# Patient Record
Sex: MALE | Race: WHITE | NOT HISPANIC OR LATINO | URBAN - METROPOLITAN AREA
[De-identification: names, ages, dates, MRNs, and addresses within clinical notes are randomized per-mention and may not be internally consistent; named-entity substitution may affect disease eponyms.]

---

## 2017-03-29 DIAGNOSIS — L64.9 ANDROGENIC ALOPECIA: ICD-10-CM

## 2017-03-29 DIAGNOSIS — L65.9 LOSS OF HAIR: ICD-10-CM

## 2017-03-29 RX ORDER — FINASTERIDE 1 MG/1
TABLET, FILM COATED ORAL
Qty: 90 TABLET | Refills: 1 | Status: SHIPPED | OUTPATIENT
Start: 2017-03-29 | End: 2017-10-19

## 2017-03-29 NOTE — TELEPHONE ENCOUNTER
Received a refill request for finasteride as the resident on call. Patient was last seen 3/21/16 by Dr. Alvarenga for androgenetic alopecia with directions for him to f/u in 1.5 years. After reviewing the patient's chart and the assessment and plan, refill request x 6 months was accepted.     Aliza Miranda MD  PGY-3 Dermatology  Pager: [phone]

## 2017-09-30 ENCOUNTER — HOSPITAL ENCOUNTER (EMERGENCY)
Facility: HOSPITAL | Age: 32
Discharge: HOME | End: 2017-09-30
Attending: EMERGENCY MEDICINE

## 2017-09-30 VITALS
RESPIRATION RATE: 20 BRPM | DIASTOLIC BLOOD PRESSURE: 85 MMHG | HEIGHT: 72 IN | TEMPERATURE: 98 F | SYSTOLIC BLOOD PRESSURE: 168 MMHG | WEIGHT: 200 LBS | OXYGEN SATURATION: 100 % | HEART RATE: 122 BPM | BODY MASS INDEX: 27.09 KG/M2

## 2017-09-30 DIAGNOSIS — R56.9 NEW ONSET SEIZURE: ICD-10-CM

## 2017-09-30 DIAGNOSIS — T67.5XXA HEAT EXHAUSTION, INITIAL ENCOUNTER: ICD-10-CM

## 2017-09-30 DIAGNOSIS — R56.9 SEIZURE: ICD-10-CM

## 2017-09-30 DIAGNOSIS — E86.0 DEHYDRATION: Primary | ICD-10-CM

## 2017-09-30 LAB
ALBUMIN SERPL BCP-MCNC: 4.5 G/DL
ALP SERPL-CCNC: 60 U/L
ALT SERPL W/O P-5'-P-CCNC: 116 U/L
AMPHET+METHAMPHET UR QL: NEGATIVE
ANION GAP SERPL CALC-SCNC: 19 MMOL/L
AST SERPL-CCNC: 109 U/L
BARBITURATES UR QL SCN>200 NG/ML: NEGATIVE
BASOPHILS # BLD AUTO: 0 K/UL
BASOPHILS NFR BLD: 0.2 %
BENZODIAZ UR QL SCN>200 NG/ML: NEGATIVE
BILIRUB SERPL-MCNC: 1.2 MG/DL
BUN SERPL-MCNC: 12 MG/DL
BZE UR QL SCN: NEGATIVE
CALCIUM SERPL-MCNC: 10.2 MG/DL
CANNABINOIDS UR QL SCN: NEGATIVE
CHLORIDE SERPL-SCNC: 95 MMOL/L
CO2 SERPL-SCNC: 19 MMOL/L
CREAT SERPL-MCNC: 1.1 MG/DL
CREAT UR-MCNC: 117.2 MG/DL
DIFFERENTIAL METHOD: ABNORMAL
EOSINOPHIL # BLD AUTO: 0 K/UL
EOSINOPHIL NFR BLD: 0.3 %
ERYTHROCYTE [DISTWIDTH] IN BLOOD BY AUTOMATED COUNT: 13 %
EST. GFR  (AFRICAN AMERICAN): >60 ML/MIN/1.73 M^2
EST. GFR  (NON AFRICAN AMERICAN): >60 ML/MIN/1.73 M^2
ETHANOL SERPL-MCNC: <10 MG/DL
GLUCOSE SERPL-MCNC: 142 MG/DL
HCT VFR BLD AUTO: 43.4 %
HGB BLD-MCNC: 15 G/DL
LYMPHOCYTES # BLD AUTO: 0.5 K/UL
LYMPHOCYTES NFR BLD: 7.3 %
MAGNESIUM SERPL-MCNC: 2.1 MG/DL
MCH RBC QN AUTO: 32.7 PG
MCHC RBC AUTO-ENTMCNC: 34.5 G/DL
MCV RBC AUTO: 95 FL
METHADONE UR QL SCN>300 NG/ML: NEGATIVE
MONOCYTES # BLD AUTO: 0.6 K/UL
MONOCYTES NFR BLD: 8.4 %
NEUTROPHILS # BLD AUTO: 5.8 K/UL
NEUTROPHILS NFR BLD: 83.8 %
OPIATES UR QL SCN: NEGATIVE
PCP UR QL SCN>25 NG/ML: NEGATIVE
PHOSPHATE SERPL-MCNC: 1.9 MG/DL
PLATELET # BLD AUTO: 221 K/UL
PMV BLD AUTO: 7.7 FL
POTASSIUM SERPL-SCNC: 4 MMOL/L
PROT SERPL-MCNC: 7.8 G/DL
RBC # BLD AUTO: 4.58 M/UL
SODIUM SERPL-SCNC: 133 MMOL/L
TOXICOLOGY INFORMATION: NORMAL
WBC # BLD AUTO: 6.9 K/UL

## 2017-09-30 PROCEDURE — 80320 DRUG SCREEN QUANTALCOHOLS: CPT

## 2017-09-30 PROCEDURE — 99285 EMERGENCY DEPT VISIT HI MDM: CPT | Mod: 25

## 2017-09-30 PROCEDURE — 93005 ELECTROCARDIOGRAM TRACING: CPT

## 2017-09-30 PROCEDURE — 83735 ASSAY OF MAGNESIUM: CPT

## 2017-09-30 PROCEDURE — 25000003 PHARM REV CODE 250: Performed by: EMERGENCY MEDICINE

## 2017-09-30 PROCEDURE — 85025 COMPLETE CBC W/AUTO DIFF WBC: CPT

## 2017-09-30 PROCEDURE — 96361 HYDRATE IV INFUSION ADD-ON: CPT

## 2017-09-30 PROCEDURE — 36415 COLL VENOUS BLD VENIPUNCTURE: CPT

## 2017-09-30 PROCEDURE — 80053 COMPREHEN METABOLIC PANEL: CPT

## 2017-09-30 PROCEDURE — 84100 ASSAY OF PHOSPHORUS: CPT

## 2017-09-30 PROCEDURE — 96360 HYDRATION IV INFUSION INIT: CPT

## 2017-09-30 PROCEDURE — 80307 DRUG TEST PRSMV CHEM ANLYZR: CPT

## 2017-09-30 RX ORDER — SODIUM CHLORIDE 9 MG/ML
1000 INJECTION, SOLUTION INTRAVENOUS
Status: COMPLETED | OUTPATIENT
Start: 2017-09-30 | End: 2017-09-30

## 2017-09-30 RX ORDER — NAPROXEN 250 MG/1
500 TABLET ORAL
Status: DISCONTINUED | OUTPATIENT
Start: 2017-09-30 | End: 2017-09-30

## 2017-09-30 RX ADMIN — SODIUM CHLORIDE 1000 ML: 0.9 INJECTION, SOLUTION INTRAVENOUS at 01:09

## 2017-09-30 NOTE — ED PROVIDER NOTES
"Encounter Date: 9/30/2017    SCRIBE #1 NOTE: I, Bhakti Orlando , am scribing for, and in the presence of,  Dr. Dumas . I have scribed the entire note.       History     Chief Complaint   Patient presents with    Seizures     seizure today without hx.         09/30/2017  1:14 PM     Chief Complaint: Sz      The patient is a 31 y.o. male who is presenting with the acute onset of a sz that occurred earlier today while on a swamp tour. Per pt's wife, the pt became "very hot, diaphoretic, and began to sz while in the bathroom". She reports that the pt fell backwards, landed on the floor, and started to have "whole body shaking". She denies knowing how long the episode lasted but confirms positive post ictal state. No bladder incontinence. The pt endorses positive LOC with current bilateral LE myalgias, feelings of confusion, and generalized fatigue. The pt confirms having approximately 2 alcohol drinks the previous night but denies drug use or preceding sx including numbness, CP, HA, or changes in vision. No other comments or complaints. No pertinent past medical or surgical hx.             The history is provided by the patient, medical records and the spouse.     Review of patient's allergies indicates:  No Known Allergies  History reviewed. No pertinent past medical history.  History reviewed. No pertinent surgical history.  History reviewed. No pertinent family history.  Social History   Substance Use Topics    Smoking status: Former Smoker    Smokeless tobacco: Not on file    Alcohol use Not on file     Review of Systems   Constitutional: Positive for diaphoresis and fatigue. Negative for fever.   HENT: Negative for sore throat.    Eyes: Negative for visual disturbance.   Respiratory: Negative for shortness of breath.    Cardiovascular: Negative for chest pain.   Gastrointestinal: Negative for nausea.   Genitourinary: Negative for dysuria.   Musculoskeletal: Negative for back pain.   Skin: Negative for rash. "   Neurological: Positive for seizures and syncope. Negative for weakness.        +LOC   Hematological: Does not bruise/bleed easily.   Psychiatric/Behavioral: Negative for confusion.       Physical Exam     Initial Vitals [09/30/17 1300]   BP Pulse Resp Temp SpO2   (!) 168/85 (!) 122 20 97.9 °F (36.6 °C) 100 %      MAP       112.67         Physical Exam    Nursing note and vitals reviewed.  Constitutional: He appears well-developed and well-nourished.   HENT:   Head: Normocephalic and atraumatic.   Mouth/Throat: Oropharynx is clear and moist.   Eyes: EOM are normal. Pupils are equal, round, and reactive to light. No scleral icterus.   Neck: Normal range of motion. Neck supple.   Cardiovascular: Regular rhythm, normal heart sounds and intact distal pulses. Exam reveals no gallop and no friction rub.    No murmur heard.  Tachycardic rate    Pulmonary/Chest: Breath sounds normal. He has no wheezes. He has no rhonchi. He has no rales. He exhibits no tenderness.   Abdominal: Soft. He exhibits no distension. There is no tenderness.   Musculoskeletal: Normal range of motion. He exhibits no edema or tenderness.   Lymphadenopathy:     He has no cervical adenopathy.   Neurological: He is alert and oriented to person, place, and time. He has normal strength. No sensory deficit.   CN II through XII intact    Skin: Skin is warm and dry. Capillary refill takes less than 2 seconds.   Psychiatric: He has a normal mood and affect.         ED Course   Procedures  Labs Reviewed   CBC W/ AUTO DIFFERENTIAL - Abnormal; Notable for the following:        Result Value    RBC 4.58 (*)     MCH 32.7 (*)     MPV 7.7 (*)     Lymph # 0.5 (*)     Gran% 83.8 (*)     Lymph% 7.3 (*)     All other components within normal limits   COMPREHENSIVE METABOLIC PANEL - Abnormal; Notable for the following:     Sodium 133 (*)     CO2 19 (*)     Glucose 142 (*)     Total Bilirubin 1.2 (*)      (*)      (*)     Anion Gap 19 (*)     All other  components within normal limits   PHOSPHORUS - Abnormal; Notable for the following:     Phosphorus 1.9 (*)     All other components within normal limits   MAGNESIUM   DRUG SCREEN PANEL, URINE EMERGENCY   ALCOHOL,MEDICAL (ETHANOL)     EKG Readings: (Independently Interpreted)   Rhythm: Sinus Tachycardia. Heart Rate: 106. Ectopy: No Ectopy. Conduction: Normal. Axis: Normal. Clinical Impression: Sinus Tachycardia          Medical Decision Making:   Clinical Tests:   Lab Tests: Ordered and Reviewed  The following lab test(s) were unremarkable: CBC and CMP  Radiological Study: Ordered and Reviewed  ED Management:  Reji Nicole is a 31 y.o. male who presents with  a witnessed seizure with a reported jaw dislocation which was reduced by a dentist.  He reports profound diaphoresis in excessive heat suggesting he exhaustion.  CT of the head is normal with no focal neurologic deficits.  This may represent he exhaustion versus new onset seizure.  In the absence of any focal neurologic deficit no further workup is mandatory with antiepileptics deferred.  He is encouraged to follow-up with a neurologist for consideration of any EEG.            Scribe Attestation:   Scribe #1: I performed the above scribed service and the documentation accurately describes the services I performed. I attest to the accuracy of the note.    Attending Attestation:           Physician Attestation for Scribe:  Physician Attestation Statement for Scribe #1: I, Dr. Dumas, reviewed documentation, as scribed by Bhakti Orlando  in my presence, and it is both accurate and complete.                 ED Course      Clinical Impression:   Diagnoses of Seizure and Seizure were pertinent to this visit.                           Piero Dumas III, MD  09/30/17 4504

## 2017-09-30 NOTE — ED NOTES
Patient ambulating in ED; states the calf stiffness is loosening up;  Verbalizes readiness for discharge and declined further treatment.

## 2017-10-11 DIAGNOSIS — L64.9 ANDROGENIC ALOPECIA: ICD-10-CM

## 2017-10-11 DIAGNOSIS — L65.9 LOSS OF HAIR: ICD-10-CM

## 2017-10-11 RX ORDER — FINASTERIDE 1 MG/1
1 TABLET, FILM COATED ORAL DAILY
Qty: 90 TABLET | Refills: 1 | OUTPATIENT
Start: 2017-10-11

## 2017-10-11 NOTE — TELEPHONE ENCOUNTER
Last seen 3/21/16: Chito is on the recall list for a follow up appointment   1. Androgenetic alopecia    Continue with finasteride 1 mg daily      Follow-up in 1 1/2 years, earlier for new or changing lesions or if other concerns

## 2017-10-11 NOTE — TELEPHONE ENCOUNTER
Pt has not been seen since March 2016 and is requesting a med refill. Medications will not be refilled until pt RTC

## 2017-10-19 DIAGNOSIS — L64.9 ANDROGENIC ALOPECIA: ICD-10-CM

## 2017-10-19 DIAGNOSIS — L65.9 LOSS OF HAIR: ICD-10-CM

## 2017-10-19 RX ORDER — FINASTERIDE 1 MG/1
TABLET, FILM COATED ORAL
Qty: 30 TABLET | Refills: 0 | Status: SHIPPED | OUTPATIENT
Start: 2017-10-19 | End: 2017-11-06

## 2017-10-19 NOTE — TELEPHONE ENCOUNTER
Pt will be scheduled for an appointment as soon as  opens up a clinic date.   Are you able to refill a month worth of  Finasteride. He has been out of medication for the past two days.       Ronda Rojas RN

## 2017-11-06 ENCOUNTER — OFFICE VISIT (OUTPATIENT)
Dept: DERMATOLOGY | Facility: CLINIC | Age: 32
End: 2017-11-06

## 2017-11-06 DIAGNOSIS — L65.9 LOSS OF HAIR: ICD-10-CM

## 2017-11-06 DIAGNOSIS — L64.9 ANDROGENIC ALOPECIA: ICD-10-CM

## 2017-11-06 RX ORDER — FINASTERIDE 1 MG/1
1 TABLET, FILM COATED ORAL DAILY
Qty: 90 TABLET | Refills: 7 | Status: SHIPPED | OUTPATIENT
Start: 2017-11-06 | End: 2018-11-28

## 2017-11-06 ASSESSMENT — PAIN SCALES - GENERAL: PAINLEVEL: NO PAIN (0)

## 2017-11-06 NOTE — NURSING NOTE
"Dermatology Rooming Note    Chito Driver's goals for this visit include:   Chief Complaint   Patient presents with     Hair Loss     Chito is here today for a hair loss follow up. chito notes\" Everything is great\"      Merry Mao, RENA        "

## 2017-11-06 NOTE — LETTER
"11/6/2017       RE: Chito Driver  [address] [facility] [address]     Dear Colleague,    Thank you for referring your patient, Chito Driver, to the [facility] DERMATOLOGY at [facility]. Please see a copy of my visit note below.    [facility] Dermatology Note      Dermatology Problem List:  1. Androgenetic Alopecia, well-controlled and improved   -finasteride 1mg daily    Encounter Date: Nov 6, 2017    CC:  Chief Complaint   Patient presents with     Hair Loss     Chito is here today for a hair loss follow up. chito notes\" Everything is great\"          History of Present Illness:  Mr. Chito Driver is a 32 year old male who presents as a follow-up for androgenetic alopecia. The patient was last seen 3/21/2016 with Dr. Alvarenga when he was continued on his finasteride 1 mg.  Patient is happy that his hairline has returned to its previous location before the loss and he believes that the density of hair is close to or the same as before hair loss began.  He has no complaints of sexual side effects or other medication SEs.  He has had no interval health changes.  No burning, itching, and pain of the scalp. Uses nioxin 1-2x/wk and J crew the rest of the time for shampoo and denies any flaking or buildup.     Past Medical History:   Patient Active Problem List   Diagnosis     Social phobia     Loss of hair     Insomnia     Transplant donor evaluation     Androgenic alopecia     Hyperlipidemia LDL goal <160     Calculus of kidney     Past Medical History:   Diagnosis Date     Kidney stone      Past Surgical History:   Procedure Laterality Date     ORTHOPEDIC SURGERY         Social History:  The patient works as a .     Family History:  There is a family history of hair loss in the patient's father and both grandfathers.     Medications:  Current Outpatient Prescriptions   Medication Sig Dispense Refill     finasteride (PROPECIA) " 1 MG tablet TAKE ONE TABLET BY MOUTH DAILY 30 tablet 0     No Known Allergies      Review of Systems:  -As per HPI  -Constitutional: The patient denies fatigue, fevers, chills, unintended weight loss, and night sweats.  -HEENT: Patient denies nonhealing oral sores.  -Skin: As above in HPI. No additional skin concerns.    Physical exam:  Vitals: There were no vitals taken for this visit.  GEN: This is a well developed, well-nourished male in no acute distress, in a pleasant mood.    SKIN: Focused examination of the scalp was performed.  -strong robust regrowth of the frontotemporal scalp bilaterally with shorter hairs noted compared to rest of the scalp which had no signs of alopecia   - no significant erythema, folliculitis or scale  -No other lesions of concern on areas examined.     Impression/Plan:  1. Androgenetic alopecia    Continue with finasteride 1 mg daily, 2 years of refills sent    If he develops scale recommended an anti dandruff shampoo    Follow-up in 2 years, earlier for new or changing lesions or if other concerns    Staffed with Dr. Alvarenga    Staff Involved:  Resident (Elsi Miguel MD) / Staff (as above)      Patient was seen and examined with the dermatology resident. I agree with the history, review of systems, physical examination, assessments and plan.    Criss Alvarenga MD  Professor and  Chair  Department of Dermatology  [facility]    Pictures were placed in Pt's chart today for future reference.              Again, thank you for allowing me to participate in the care of your patient.      Sincerely,    Criss Alvarenga MD

## 2017-11-06 NOTE — PROGRESS NOTES
"[facility] Dermatology Note      Dermatology Problem List:  1. Androgenetic Alopecia, well-controlled and improved   -finasteride 1mg daily    Encounter Date: Nov 6, 2017    CC:  Chief Complaint   Patient presents with     Hair Loss     Chito is here today for a hair loss follow up. chito notes\" Everything is great\"          History of Present Illness:  Mr. Chito Driver is a 32 year old male who presents as a follow-up for androgenetic alopecia. The patient was last seen 3/21/2016 with Dr. Alvarenga when he was continued on his finasteride 1 mg.  Patient is happy that his hairline has returned to its previous location before the loss and he believes that the density of hair is close to or the same as before hair loss began.  He has no complaints of sexual side effects or other medication SEs.  He has had no interval health changes.  No burning, itching, and pain of the scalp. Uses nioxin 1-2x/wk and J crew the rest of the time for shampoo and denies any flaking or buildup.     Past Medical History:   Patient Active Problem List   Diagnosis     Social phobia     Loss of hair     Insomnia     Transplant donor evaluation     Androgenic alopecia     Hyperlipidemia LDL goal <160     Calculus of kidney     Past Medical History:   Diagnosis Date     Kidney stone      Past Surgical History:   Procedure Laterality Date     ORTHOPEDIC SURGERY         Social History:  The patient works as a .     Family History:  There is a family history of hair loss in the patient's father and both grandfathers.     Medications:  Current Outpatient Prescriptions   Medication Sig Dispense Refill     finasteride (PROPECIA) 1 MG tablet TAKE ONE TABLET BY MOUTH DAILY 30 tablet 0     No Known Allergies      Review of Systems:  -As per HPI  -Constitutional: The patient denies fatigue, fevers, chills, unintended weight loss, and night sweats.  -HEENT: Patient denies nonhealing oral sores.  -Skin: As above " in HPI. No additional skin concerns.    Physical exam:  Vitals: There were no vitals taken for this visit.  GEN: This is a well developed, well-nourished male in no acute distress, in a pleasant mood.    SKIN: Focused examination of the scalp was performed.  -strong robust regrowth of the frontotemporal scalp bilaterally with shorter hairs noted compared to rest of the scalp which had no signs of alopecia   - no significant erythema, folliculitis or scale  -No other lesions of concern on areas examined.     Impression/Plan:  1. Androgenetic alopecia    Continue with finasteride 1 mg daily, 2 years of refills sent    If he develops scale recommended an anti dandruff shampoo    Follow-up in 2 years, earlier for new or changing lesions or if other concerns    Staffed with Dr. Alvarenga    Staff Involved:  Resident (Elsi Miguel MD) / Staff (as above)      Patient was seen and examined with the dermatology resident. I agree with the history, review of systems, physical examination, assessments and plan.    Criss Alvarenga MD  Professor and  Chair  Department of Dermatology  [facility]

## 2017-11-06 NOTE — MR AVS SNAPSHOT
After Visit Summary   2017    Chito Driver    MRN: [id number]           Patient Information     Date Of Birth          1985        Visit Information        Provider Department      2017 2:45 PM Criss Alvarenga MD [facility] Dermatology        Today's Diagnoses     Loss of hair        Androgenic alopecia           Follow-ups after your visit        Who to contact     Please call your clinic at [phone] to:    Ask questions about your health    Make or cancel appointments    Discuss your medicines    Learn about your test results    Speak to your doctor   If you have compliments or concerns about an experience at your clinic, or if you wish to file a complaint, please contact [facility] Patient Relations at [phone] or email us at [email or web address]         Additional Information About Your Visit        MyChart Information     Doctor on Demand is an electronic gateway that provides easy, online access to your medical records. With Doctor on Demand, you can request a clinic appointment, read your test results, renew a prescription or communicate with your care team.     To sign up for [employer] visit the website at [email or web address]   You will be asked to enter the access code listed below, as well as some personal information. Please follow the directions to create your username and password.     Your access code is: [email or web address]  Expires: 2018  5:30 AM     Your access code will  in 90 days. If you need help or a new code, please contact your [facility] Clinic or call [phone] for assistance.        Care EveryWhere ID     This is your Care EveryWhere ID. This could be used by other organizations to access your [place] medical records  [id number]         Blood Pressure from Last 3 Encounters:   16 136/83   10/16/15 127/84   10/08/15 (!) 136/92    Weight from Last 3 Encounters:   10/16/15  108.2 kg (238 lb 8 oz)   10/08/15 97.5 kg (215 lb)   10/08/15 105.2 kg (232 lb)              Today, you had the following     No orders found for display         Today's Medication Changes          These changes are accurate as of: 11/6/17 11:59 PM.  If you have any questions, ask your nurse or doctor.               These medicines have changed or have updated prescriptions.        Dose/Directions    finasteride 1 MG tablet   Commonly known as:  PROPECIA   This may have changed:  See the new instructions.   Used for:  Loss of hair, Androgenic alopecia   Changed by:  Criss Alvarenga MD        Dose:  1 tablet   Take 1 tablet (1 mg) by mouth daily   Quantity:  90 tablet   Refills:  7            Where to get your medicines      These medications were sent to [facility] [address] [address] AT [facility] OF [address] [address]     Phone:  [phone]     finasteride 1 MG tablet                Primary Care Provider Office Phone # Fax #    [facility] [phone]       [address] [facility] [address]  [facility] [address]        Equal Access to Services     KATY CRESPO AH: Hadii alfonso beltran hadanthonyo Soteri, waaxda luqadaha, qaybta kaalmada adeegyada, baylee holley. So [facility] [phone].    ATENCIÓN: Si habla español, tiene a cunningham disposición servicios gratuitos de asistencia lingüística. [facility] [phone].    We comply with applicable federal civil rights laws and [place] laws. We do not discriminate on the basis of race, color, national origin, age, disability, sex, sexual orientation, or gender identity.            Thank you!     Thank you for choosing [facility] DERMATOLOGY  for your care. Our goal is always to provide you with excellent care. Hearing back from our patients is one way we can continue to improve our services. Please take a few minutes to complete the written survey that you may  receive in the mail after your visit with us. Thank you!             Your Updated Medication List - Protect others around you: Learn how to safely use, store and throw away your medicines at www.disposemymeds.org.          This list is accurate as of: 11/6/17 11:59 PM.  Always use your most recent med list.                   Brand Name Dispense Instructions for use Diagnosis    finasteride 1 MG tablet    PROPECIA    90 tablet    Take 1 tablet (1 mg) by mouth daily    Loss of hair, Androgenic alopecia

## 2017-11-07 ENCOUNTER — TELEPHONE (OUTPATIENT)
Dept: DERMATOLOGY | Facility: CLINIC | Age: 32
End: 2017-11-07

## 2017-11-07 NOTE — TELEPHONE ENCOUNTER
"PRIOR AUTHORIZATION DENIED    Medication: finasteride (PROPECIA) 1 MG tablet - denied     Denial Date: 11/7/2017    Denial Rational: \"Treatment, procedures, or services or drugs which are not medically or dental necessary and/or are primarily educational in nature of for the vocation, comfort.....are not covered\"    Appeal Information:         "

## 2017-11-07 NOTE — TELEPHONE ENCOUNTER
[facility] Prior Authorization Team   Phone: [phone]  Fax: [phone]      PA Initiation    Medication: finasteride (PROPECIA) 1 MG tablet  Insurance Company: [employer] - Phone [phone] Fax [phone]  Pharmacy Filling the Rx: [facility] [id number] [place] [address] [place] AT [facility] OF [facility] [place]  Filling Pharmacy Phone: [phone]  Filling Pharmacy Fax: [phone]  Start Date: 11/7/2017

## 2018-11-23 DIAGNOSIS — L65.9 LOSS OF HAIR: ICD-10-CM

## 2018-11-23 DIAGNOSIS — L64.9 ANDROGENIC ALOPECIA: ICD-10-CM

## 2018-11-27 RX ORDER — FINASTERIDE 1 MG/1
TABLET, FILM COATED ORAL
Qty: 90 TABLET | Refills: 0 | OUTPATIENT
Start: 2018-11-27

## 2018-11-27 NOTE — TELEPHONE ENCOUNTER
Last Clinic Visit:  11/6/17   NV: NONE  RF DENIED  Scheduling has been notified to contact the pt for appointment

## 2018-11-28 DIAGNOSIS — L64.9 ANDROGENIC ALOPECIA: ICD-10-CM

## 2018-11-28 DIAGNOSIS — L65.9 LOSS OF HAIR: ICD-10-CM

## 2018-11-28 RX ORDER — FINASTERIDE 1 MG/1
1 TABLET, FILM COATED ORAL DAILY
Qty: 90 TABLET | Refills: 3 | Status: SHIPPED | OUTPATIENT
Start: 2018-11-28 | End: 2019-12-03

## 2019-12-03 ENCOUNTER — TELEPHONE (OUTPATIENT)
Dept: DERMATOLOGY | Facility: CLINIC | Age: 34
End: 2019-12-03

## 2019-12-03 DIAGNOSIS — L65.9 LOSS OF HAIR: ICD-10-CM

## 2019-12-03 DIAGNOSIS — L64.9 ANDROGENIC ALOPECIA: ICD-10-CM

## 2019-12-03 RX ORDER — FINASTERIDE 1 MG/1
1 TABLET, FILM COATED ORAL DAILY
Qty: 30 TABLET | Refills: 0 | Status: SHIPPED | OUTPATIENT
Start: 2019-12-03 | End: 2021-01-30

## 2019-12-03 NOTE — TELEPHONE ENCOUNTER
finasteride (PROPECIA) 1 MG tablet      Last Written Prescription Date:  11/28/18  Last Fill Quantity: 90 tab,   # refills: 3  Last Office Visit : 11/6/17  Future Office visit:  Recall list    Routing refill request to provider for review/approval because:  1. Medication not on the Derm refill protocol.  2. Appointment due Nov 2019- on recall list. *message sent to clinic coordinator.    Plan 11/6/17 visit:  Androgenetic alopecia    Continue with finasteride 1 mg daily, 2 years of refills sent    If he develops scale recommended an anti dandruff shampoo     Follow-up in 2 years, earlier for new or changing lesions or if other concerns.

## 2019-12-04 NOTE — TELEPHONE ENCOUNTER
Received refill request for finasteride as the resident on call. Reviewed patient's chart and attached communication. Patient last seen 11/2017 for alopecia. RTC 2 years,  not yet scheduled. After reviewing the medication list and assessment and plan from last visit, the refill request was accepted for one month s supply until patient can be scheduled in clinic. No further refills should be granted until patient is scheduled in clinic.    The patient's information will be forwarded to the scheduling pool for return visit as planned at prior visit.      Almaz Levine PGY2  Dermatology Resident  pager

## 2019-12-05 DIAGNOSIS — L64.9 ANDROGENIC ALOPECIA: ICD-10-CM

## 2019-12-05 DIAGNOSIS — L65.9 LOSS OF HAIR: ICD-10-CM

## 2019-12-06 RX ORDER — FINASTERIDE 1 MG/1
TABLET, FILM COATED ORAL
Qty: 90 TABLET | Refills: 0 | OUTPATIENT
Start: 2019-12-06

## 2019-12-06 NOTE — TELEPHONE ENCOUNTER
"finasteride (PROPECIA) 1 MG tablet. 90 day supply requested.    Last clinic visit: 11/6/17    Per resident note 12/3/19:   \"Received refill request for finasteride as the resident on call. Reviewed patient's chart and attached communication. Patient last seen 11/2017 for alopecia. RTC 2 years,  not yet scheduled. After reviewing the medication list and assessment and plan from last visit, the refill request was accepted for one month s supply until patient can be scheduled in clinic. No further refills should be granted until patient is scheduled in clinic.\"    Request refused with instructions to use 12/3 order of 30 day supply. Patient to call clinic for any further refills/supply.           "

## 2020-01-16 DIAGNOSIS — L65.9 LOSS OF HAIR: ICD-10-CM

## 2020-01-16 DIAGNOSIS — L64.9 ANDROGENIC ALOPECIA: ICD-10-CM

## 2020-01-17 RX ORDER — FINASTERIDE 1 MG/1
1 TABLET, FILM COATED ORAL DAILY
Qty: 30 TABLET | Refills: 0 | OUTPATIENT
Start: 2020-01-17

## 2020-01-17 NOTE — TELEPHONE ENCOUNTER
[facility] Call Center    Phone Message    May a detailed message be left on voicemail: yes    Reason for Call: pt called for a refill of Finasteride prescribed by Dr Alvarenga. Pt states he can't make an appt because he is at the [facility] for the past 2 months. He states the medication is working great and is hoping for a refill. Please send prescription to     [facility]  Address: [address] [place] [id number]  Phone: [phone]      Action Taken: Message routed to:  [facility] ([facility] DERM

## 2020-01-17 NOTE — TELEPHONE ENCOUNTER
Writer will route to [facility] for consideration. This medication refill was denied in December (see 12/5 encounter if needed) Pt was instructed to contact clinic.

## 2020-01-17 NOTE — TELEPHONE ENCOUNTER
Received refill request for finasteride as the resident on call. Reviewed patient's chart and attached communication. Patient last seen in 2017. RTC not yet been scheduled. Reviewed medication list and assessment and plan from last visit. The refill request is declined until patient schedules an appointment. He has previously been offered a 1 month refill with plan to follow up but failed to make an appointment.     The patient's information will be forwarded to the scheduling pool for return visit as planned at prior visit.

## 2020-01-20 ENCOUNTER — TELEPHONE (OUTPATIENT)
Dept: DERMATOLOGY | Facility: CLINIC | Age: 35
End: 2020-01-20

## 2020-01-20 NOTE — TELEPHONE ENCOUNTER
[facility] Call Center    Phone Message    May a detailed message be left on voicemail: yes    Reason for Call: Medication Refill Request    Has the patient contacted the pharmacy for the refill? Yes   Name of medication being requested: finasteride (PROPECIA) 1 MG tablet    Provider who prescribed the medication: Criss Alvarenga MD   Pharmacy: [id number] - [place], [place] - [address]  Date medication is needed: ASAP     Pt apologizes for not being able to come in for an appointment prior to getting refilled but he is currently in a recovery clinic and cannot leave.  Pt wants us to know how well the meds have worked in the past and that he can send us photos if we need to assess his condition, but please make an exception for him and authorize refill without appt.    If we do refill we should double check pharmacy location     Action Taken: Message routed to:  [facility] ([facility]): dermatology

## 2020-01-20 NOTE — TELEPHONE ENCOUNTER
As patient has not been seen since 2017, will defer decision to prescribe finasteride to attending, Dr. Alvarenga, who will be available tomorrow. Thank you!

## 2020-01-21 NOTE — TELEPHONE ENCOUNTER
[facility] Call Center    Phone Message    May a detailed message be left on voicemail: yes    Reason for Call: Medication Refill Request    Has the patient contacted the pharmacy for the refill? Yes   Name of medication being requested: finasteride (PROPECIA) 1 MG tablet  Provider who prescribed the medication:   Pharmacy: [id number] - [place], [place] - [address]  Date medication is needed: asap   -pt has an appt this Friday 1/24/2020, pt is wondering if he can get a refill before Friday, as he is out of medication, thanks!      Action Taken: Message routed to:  [facility] ([facility]): derm

## 2020-01-22 NOTE — TELEPHONE ENCOUNTER
Given patient not seen in 2 years and has appointment in 2 days, will defer refill until he is seen in 2 days. Doubt that 2 days of medication will make a difference at this point. Thanks!

## 2020-03-16 DIAGNOSIS — L64.9 ANDROGENIC ALOPECIA: ICD-10-CM

## 2020-03-16 DIAGNOSIS — L65.9 LOSS OF HAIR: ICD-10-CM

## 2020-03-19 RX ORDER — FINASTERIDE 1 MG/1
TABLET, FILM COATED ORAL
Qty: 30 TABLET | Refills: 0 | OUTPATIENT
Start: 2020-03-19

## 2021-01-28 DIAGNOSIS — L64.9 ANDROGENIC ALOPECIA: ICD-10-CM

## 2021-01-28 DIAGNOSIS — L65.9 LOSS OF HAIR: ICD-10-CM

## 2021-01-30 RX ORDER — FINASTERIDE 1 MG/1
1 TABLET, FILM COATED ORAL DAILY
Qty: 30 TABLET | Refills: 0 | OUTPATIENT
Start: 2021-01-30

## 2021-01-30 NOTE — TELEPHONE ENCOUNTER
per scheduling:  Alonzo Medina Linda M, RN             The pt is scheduled and very concerned about getting a refill.   Alonzo Medina on 1/30/2021 at 1:12 PM

## 2021-01-30 NOTE — TELEPHONE ENCOUNTER
"  finasteride (PROPECIA) 1 MG tablet  Last Written Prescription Date:  12/3/19  Last Fill Quantity: 30,   # refills: 0  Last Office Visit : 11/6/17  Future Office visit: 6/21/21    Refused.    routed because: med refused. lv 11/17. Last fill 12/3/19 #30. per scheduling \" The pt is scheduled and very concerned about getting a refill. \"  F/u appt  6/21/21. rf?  "

## 2021-02-01 RX ORDER — FINASTERIDE 1 MG/1
1 TABLET, FILM COATED ORAL DAILY
Qty: 30 TABLET | Refills: 4 | Status: SHIPPED | OUTPATIENT
Start: 2021-02-01

## 2021-11-04 DIAGNOSIS — L65.9 LOSS OF HAIR: ICD-10-CM

## 2021-11-04 DIAGNOSIS — L64.9 ANDROGENIC ALOPECIA: ICD-10-CM

## 2021-11-04 RX ORDER — FINASTERIDE 1 MG/1
1 TABLET, FILM COATED ORAL DAILY
Qty: 30 TABLET | Refills: 4 | OUTPATIENT
Start: 2021-11-04

## 2022-05-23 ENCOUNTER — TELEPHONE (OUTPATIENT)
Dept: DERMATOLOGY | Facility: CLINIC | Age: 37
End: 2022-05-23

## 2022-05-23 NOTE — TELEPHONE ENCOUNTER
Patient No showed his appointment in dermatology today. NO refills will be given until patient is seen in dermatology. His last visit was 2017 patient is considered a NEW dermatology patient.    RENA Wright

## 2022-08-22 ENCOUNTER — LAB (OUTPATIENT)
Dept: LAB | Facility: CLINIC | Age: 37
End: 2022-08-22
Payer: COMMERCIAL

## 2022-08-22 DIAGNOSIS — Z31.41 ENCOUNTER FOR SPERM COUNT FOR FERTILITY TESTING: ICD-10-CM

## 2022-08-22 LAB
ABNORMAL SPERM MORPHOLOGY: 98
ABSTINENCE DAYS: 7 DAYS (ref 2–7)
AGGLUTINATION: NO
ANALYSIS TEMP - CENTIGRADE: 22 CENTIGRADE
COLLECTION METHOD: ABNORMAL
COLLECTION SITE: ABNORMAL
CONSENT TO RELEASE TO PARTNER: YES
DAL- RECEIVED TIME: ABNORMAL
HEAD DEFECT: 98 %
IMMOTILE: 62 %
LIQUEFIED: YES
MIDPIECE DEFECT: 54 %
NON-PROGRESSIVE MOTILITY: 4 %
NORMAL SPERM MORPHOLOGY: 2 % NORMAL FORMS
PROGRESSIVE MOTILITY: 34 %
ROUND CELLS: 0.4 MILLION/ML
SPECIMEN PH: 7.2 PH
SPECIMEN VOLUME: 3.2 ML
SPERM CONCENTRATION: 159.5 MILLION/ML
TAIL DEFECT: 7 %
TIME OF ANALYSIS: ABNORMAL
TOTAL PROGRESSIVE MOTILE NUMBER: 173 MILLION
TOTAL SPERM NUMBER: 510 MILLION
VISCOUS: NO
VITALITY: ABNORMAL

## 2022-08-22 PROCEDURE — 89322 SEMEN ANAL STRICT CRITERIA: CPT

## 2023-11-10 NOTE — TELEPHONE ENCOUNTER
As resident on call, received refill request. Chart and attached communication reviewed. Patient last seen 3/2016. Per Dr. Alvarenga, plan at that time was to continue finasteride 1 mg daily and follow up in 1.5 years. Patient overdue for follow up. One month supply provided but patient will need follow up scheduled prior to further refills.      Arleth Ritchie MD  PGY-3, Dermatology  [phone]  Resident on Call    
Last visit 3/21/16  On recall list.  Need 30 day supply.       Impression/Plan:  1. Androgenetic alopecia    Continue with finasteride 1 mg daily     Follow-up in 1 1/2 years, earlier for new or changing lesions or if other concerns     Staff Involved:  Scribed by Fabricio Akins, MS4 for Dr. Criss Alvarenga.       I agree with the PFSH and ROS as completed by the Medical Student. The remainder of the encounter was performed by me and scribed by the Medical Student. The scribed note accurately reflects my personal services and the medical decisions made by me.        . Unable to refill. Does not meet RN protocol. Will forward to ZEYAD Rojas RN     
Name band;

## 2024-04-08 ENCOUNTER — HOSPITAL ENCOUNTER (EMERGENCY)
Facility: CLINIC | Age: 39
Discharge: HOME | End: 2024-04-08
Attending: EMERGENCY MEDICINE | Admitting: EMERGENCY MEDICINE
Payer: COMMERCIAL

## 2024-04-08 VITALS
TEMPERATURE: 97.2 F | BODY MASS INDEX: 27.72 KG/M2 | OXYGEN SATURATION: 100 % | WEIGHT: 204.37 LBS | SYSTOLIC BLOOD PRESSURE: 113 MMHG | RESPIRATION RATE: 17 BRPM | HEART RATE: 70 BPM | DIASTOLIC BLOOD PRESSURE: 78 MMHG

## 2024-04-08 DIAGNOSIS — R55 SYNCOPE AND COLLAPSE: ICD-10-CM

## 2024-04-08 DIAGNOSIS — K92.2 LOWER GI BLEED: ICD-10-CM

## 2024-04-08 LAB
ABO/RH(D): NORMAL
ANION GAP SERPL CALCULATED.3IONS-SCNC: 10 MMOL/L (ref 7–15)
ANTIBODY SCREEN: NEGATIVE
ATRIAL RATE - MUSE: 70 BPM
BASOPHILS # BLD AUTO: 0 10E3/UL (ref 0–0.2)
BASOPHILS NFR BLD AUTO: 1 %
BUN SERPL-MCNC: 13.2 MG/DL (ref 6–20)
CALCIUM SERPL-MCNC: 9.4 MG/DL (ref 8.6–10)
CHLORIDE SERPL-SCNC: 103 MMOL/L (ref 98–107)
CREAT SERPL-MCNC: 1.09 MG/DL (ref 0.67–1.17)
DEPRECATED HCO3 PLAS-SCNC: 26 MMOL/L (ref 22–29)
DIASTOLIC BLOOD PRESSURE - MUSE: NORMAL MMHG
EGFRCR SERPLBLD CKD-EPI 2021: 89 ML/MIN/1.73M2
EOSINOPHIL # BLD AUTO: 0.3 10E3/UL (ref 0–0.7)
EOSINOPHIL NFR BLD AUTO: 4 %
ERYTHROCYTE [DISTWIDTH] IN BLOOD BY AUTOMATED COUNT: 12.8 % (ref 10–15)
GLUCOSE BLDC GLUCOMTR-MCNC: 138 MG/DL (ref 70–99)
GLUCOSE SERPL-MCNC: 134 MG/DL (ref 70–99)
HCT VFR BLD AUTO: 45.1 % (ref 40–53)
HGB BLD-MCNC: 15.8 G/DL (ref 13.3–17.7)
HOLD SPECIMEN: NORMAL
IMM GRANULOCYTES # BLD: 0 10E3/UL
IMM GRANULOCYTES NFR BLD: 1 %
INTERPRETATION ECG - MUSE: NORMAL
LYMPHOCYTES # BLD AUTO: 2.3 10E3/UL (ref 0.8–5.3)
LYMPHOCYTES NFR BLD AUTO: 38 %
MCH RBC QN AUTO: 31.3 PG (ref 26.5–33)
MCHC RBC AUTO-ENTMCNC: 35 G/DL (ref 31.5–36.5)
MCV RBC AUTO: 90 FL (ref 78–100)
MONOCYTES # BLD AUTO: 0.4 10E3/UL (ref 0–1.3)
MONOCYTES NFR BLD AUTO: 7 %
NEUTROPHILS # BLD AUTO: 2.9 10E3/UL (ref 1.6–8.3)
NEUTROPHILS NFR BLD AUTO: 49 %
NRBC # BLD AUTO: 0 10E3/UL
NRBC BLD AUTO-RTO: 0 /100
P AXIS - MUSE: 66 DEGREES
PLATELET # BLD AUTO: 272 10E3/UL (ref 150–450)
POTASSIUM SERPL-SCNC: 4 MMOL/L (ref 3.4–5.3)
PR INTERVAL - MUSE: 160 MS
QRS DURATION - MUSE: 88 MS
QT - MUSE: 380 MS
QTC - MUSE: 410 MS
R AXIS - MUSE: 56 DEGREES
RBC # BLD AUTO: 5.04 10E6/UL (ref 4.4–5.9)
SODIUM SERPL-SCNC: 139 MMOL/L (ref 135–145)
SPECIMEN EXPIRATION DATE: NORMAL
SYSTOLIC BLOOD PRESSURE - MUSE: NORMAL MMHG
T AXIS - MUSE: 49 DEGREES
VENTRICULAR RATE- MUSE: 70 BPM
WBC # BLD AUTO: 5.9 10E3/UL (ref 4–11)

## 2024-04-08 PROCEDURE — 96360 HYDRATION IV INFUSION INIT: CPT

## 2024-04-08 PROCEDURE — 258N000003 HC RX IP 258 OP 636: Performed by: EMERGENCY MEDICINE

## 2024-04-08 PROCEDURE — 93005 ELECTROCARDIOGRAM TRACING: CPT

## 2024-04-08 PROCEDURE — 85025 COMPLETE CBC W/AUTO DIFF WBC: CPT | Performed by: EMERGENCY MEDICINE

## 2024-04-08 PROCEDURE — 82962 GLUCOSE BLOOD TEST: CPT

## 2024-04-08 PROCEDURE — 80048 BASIC METABOLIC PNL TOTAL CA: CPT | Performed by: EMERGENCY MEDICINE

## 2024-04-08 PROCEDURE — 86900 BLOOD TYPING SEROLOGIC ABO: CPT | Performed by: EMERGENCY MEDICINE

## 2024-04-08 PROCEDURE — 99284 EMERGENCY DEPT VISIT MOD MDM: CPT | Mod: 25

## 2024-04-08 PROCEDURE — 36415 COLL VENOUS BLD VENIPUNCTURE: CPT | Performed by: EMERGENCY MEDICINE

## 2024-04-08 RX ADMIN — SODIUM CHLORIDE 1000 ML: 9 INJECTION, SOLUTION INTRAVENOUS at 08:46

## 2024-04-08 ASSESSMENT — COLUMBIA-SUICIDE SEVERITY RATING SCALE - C-SSRS
6. HAVE YOU EVER DONE ANYTHING, STARTED TO DO ANYTHING, OR PREPARED TO DO ANYTHING TO END YOUR LIFE?: NO
1. IN THE PAST MONTH, HAVE YOU WISHED YOU WERE DEAD OR WISHED YOU COULD GO TO SLEEP AND NOT WAKE UP?: NO
2. HAVE YOU ACTUALLY HAD ANY THOUGHTS OF KILLING YOURSELF IN THE PAST MONTH?: NO

## 2024-04-08 ASSESSMENT — ACTIVITIES OF DAILY LIVING (ADL)
ADLS_ACUITY_SCORE: 35
ADLS_ACUITY_SCORE: 35

## 2024-04-08 NOTE — DISCHARGE INSTRUCTIONS
Due to 2 weeks of bleeding from your rectum we have done a hemoglobin which is normal at 13.  These are commonly related to hemorrhoids or other causes if you develop severe abdominal pain or persistent heavy bleeding return to the emergency room for reassessment.  Please follow-up with GI to consider colonoscopy for further assessment of ongoing blood in the stool.  If you feel dizzy or lightheaded please sit down or lie down.  Thanks for your patience today.

## 2024-04-08 NOTE — LETTER
April 8, 2024      To Whom It May Concern:      Chito Driver was seen in our Emergency Department today, 04/08/24.  I expect his condition to improve over the next 1 days.  He may return to work/school 4/9/24    Sincerely,        Rama Leslie RN

## 2024-04-08 NOTE — ED TRIAGE NOTES
Pt arrives ambulatory for dizziness, syncope, and blood in stool for the last couple weeks. Reports blood is bright red. Pt pale in triage and reports feeling dizzy currently. Last episode of syncope while at work around 30 minutes ago.

## 2024-04-08 NOTE — ED PROVIDER NOTES
History     Chief Complaint:  Rectal Bleeding       HPI   Chito Driver is a 38 year old male who presents with lightheadedness fell at work.  Patient is a 38-year-old male is otherwise healthy.  Patient states for the last 2 weeks he has noticed blood in the stool.  This is there mixed with stool usually after he wipes.  Has had no abdominal pain with it.  Patient was at work today and felt lightheaded.  He slumped to the ground.  Patient denies trauma or injury.  Presents to the emergency room from work for assessment.  On actively no chest pain palpitations or shortness of breath.  No abdominal pain no vomiting or diarrhea.      Independent Historian:   None - Patient Only    Review of External Notes:       Medications:    finasteride (PROPECIA) 1 MG tablet        Past Medical History:    Past Medical History:   Diagnosis Date    Kidney stone        Past Surgical History:    Past Surgical History:   Procedure Laterality Date    ORTHOPEDIC SURGERY          Physical Exam   Patient Vitals for the past 24 hrs:   BP Temp Temp src Pulse Resp SpO2 Weight   04/08/24 0736 128/78 97.2  F (36.2  C) Oral 88 18 100 % 92.7 kg (204 lb 5.9 oz)        Physical Exam  Vitals reviewed.   HENT:      Mouth/Throat:      Mouth: Mucous membranes are moist.   Eyes:      Pupils: Pupils are equal, round, and reactive to light.   Cardiovascular:      Rate and Rhythm: Normal rate.   Pulmonary:      Effort: Pulmonary effort is normal.   Abdominal:      General: Abdomen is flat. Bowel sounds are normal.   Genitourinary:     Comments: There are small external hemorrhoids without signs of active bleeding.  No fissure noted  Musculoskeletal:         General: Normal range of motion.   Skin:     General: Skin is warm.      Capillary Refill: Capillary refill takes less than 2 seconds.   Neurological:      General: No focal deficit present.      Mental Status: He is alert.   Psychiatric:         Mood and Affect: Mood normal.           Emergency  Department Course   ECG  ECG taken at 0745, ECG read at 0800  Rate 70 bpm. MT interval 160 ms. QRS duration 88 ms. QT/QTc 380/410 ms. normal sinus rhythm no QT or MT prolongation.    Imaging:  No orders to display          Laboratory:  Labs Ordered and Resulted from Time of ED Arrival to Time of ED Departure   BASIC METABOLIC PANEL - Abnormal       Result Value    Sodium 139      Potassium 4.0      Chloride 103      Carbon Dioxide (CO2) 26      Anion Gap 10      Urea Nitrogen 13.2      Creatinine 1.09      GFR Estimate 89      Calcium 9.4      Glucose 134 (*)    GLUCOSE BY METER - Abnormal    GLUCOSE BY METER POCT 138 (*)    CBC WITH PLATELETS AND DIFFERENTIAL    WBC Count 5.9      RBC Count 5.04      Hemoglobin 15.8      Hematocrit 45.1      MCV 90      MCH 31.3      MCHC 35.0      RDW 12.8      Platelet Count 272      % Neutrophils 49      % Lymphocytes 38      % Monocytes 7      % Eosinophils 4      % Basophils 1      % Immature Granulocytes 1      NRBCs per 100 WBC 0      Absolute Neutrophils 2.9      Absolute Lymphocytes 2.3      Absolute Monocytes 0.4      Absolute Eosinophils 0.3      Absolute Basophils 0.0      Absolute Immature Granulocytes 0.0      Absolute NRBCs 0.0     TYPE AND SCREEN, ADULT    ABO/RH(D) A NEG      Antibody Screen Negative      SPECIMEN EXPIRATION DATE [id number]     ABO/RH TYPE AND SCREEN          Emergency Department Course & Assessments:    Interventions:  Medications - No data to display     Assessments:      Independent Interpretation (X-rays, CTs, rhythm strip):  None    Consultations/Discussion of Management or Tests:  None        Social Determinants of Health affecting care:   None    Disposition:  The patient was discharged.     Impression & Plan      MIPS (If applicable):  N/A    Medical Decision Making:  Patient presents with a few rectal bleeding and a syncopal episode at work.  She is well-appearing with normal vitals.  Hemoglobin is noted to be stable at 13.   Orthostatics also normal cause of his syncope is likely more vasovagal than blood loss anemia.  Cause of his rectal bleeding may be internal hemorrhoids but due to ongoing bleeding for 2 weeks up with GI as an outpatient.  Patient asked to return if ongoing bleeding to recheck hemoglobin      Diagnosis:    ICD-10-CM    1. Syncope and collapse  R55       2. Lower GI bleed  K92.2            Discharge Medications:  Discharge Medication List as of 4/8/2024  9:53 AM             Mat Andersen MD  4/8/2024   Mat Andersen MD Goodman, Brian Samuel, MD  04/09/24 7189